# Patient Record
Sex: MALE | Race: WHITE | URBAN - METROPOLITAN AREA
[De-identification: names, ages, dates, MRNs, and addresses within clinical notes are randomized per-mention and may not be internally consistent; named-entity substitution may affect disease eponyms.]

---

## 2019-01-25 ENCOUNTER — OFFICE VISIT (OUTPATIENT)
Dept: URBAN - METROPOLITAN AREA CLINIC 29 | Facility: CLINIC | Age: 59
End: 2019-01-25
Payer: COMMERCIAL

## 2019-01-25 DIAGNOSIS — H52.223 REGULAR ASTIGMATISM, BILATERAL: Primary | ICD-10-CM

## 2019-01-25 PROCEDURE — 92002 INTRM OPH EXAM NEW PATIENT: CPT | Performed by: OPTOMETRIST

## 2019-01-25 ASSESSMENT — VISUAL ACUITY
OD: 20/20
OS: 20/20

## 2019-01-25 ASSESSMENT — INTRAOCULAR PRESSURE
OD: 15
OS: 15

## 2021-02-24 ENCOUNTER — APPOINTMENT (OUTPATIENT)
Dept: LAB | Facility: URGENT CARE | Age: 61
End: 2021-02-24

## 2021-02-24 ENCOUNTER — OFFICE VISIT (OUTPATIENT)
Dept: URGENT CARE | Facility: URGENT CARE | Age: 61
End: 2021-02-24

## 2021-02-24 VITALS
TEMPERATURE: 98.3 F | HEIGHT: 73 IN | WEIGHT: 301 LBS | BODY MASS INDEX: 39.89 KG/M2 | RESPIRATION RATE: 16 BRPM | OXYGEN SATURATION: 97 % | SYSTOLIC BLOOD PRESSURE: 139 MMHG | HEART RATE: 70 BPM | DIASTOLIC BLOOD PRESSURE: 84 MMHG

## 2021-02-24 DIAGNOSIS — Z02.89 ENCOUNTER FOR EXAMINATION REQUIRED BY DEPARTMENT OF TRANSPORTATION (DOT): Primary | ICD-10-CM

## 2021-02-24 LAB
BILIRUB UR QL: NEGATIVE
CLARITY UR: CLEAR
COLOR UR: YELLOW
GLUCOSE UR QL: NEGATIVE MG/DL
HGB UR QL: NEGATIVE
KETONES UR-MCNC: NEGATIVE MG/DL
LEUKOCYTE ESTERASE UR QL STRIP: NEGATIVE
NITRITE UR QL: NEGATIVE
PH UR: 7 PH
PROT UR STRIP-MCNC: NEGATIVE MG/DL
SP GR UR: 1.02 (ref 1–1.03)
UROBILINOGEN UR-MCNC: 1 E.U./DL

## 2021-02-24 PROCEDURE — 99000 SPECIMEN HANDLING OFFICE-LAB: CPT | Performed by: PREVENTIVE MEDICINE

## 2021-02-24 PROCEDURE — 99455 WORK RELATED DISABILITY EXAM: CPT | Mod: DOT | Performed by: PHYSICIAN ASSISTANT

## 2021-02-24 PROCEDURE — 81003 URINALYSIS AUTO W/O SCOPE: CPT | Mod: NC | Performed by: PHYSICIAN ASSISTANT

## 2021-02-24 RX ORDER — SIMVASTATIN 20 MG/1
20 TABLET, FILM COATED ORAL NIGHTLY
COMMUNITY

## 2021-02-24 ASSESSMENT — PAIN SCALES - GENERAL: PAINLEVEL: 0-NO PAIN

## 2021-02-24 NOTE — PROGRESS NOTES
"Urgent Care Visit Note  Subjective     Chief Complaint  DOT medical clearance    History of Present Illness  Ezequiel Meng is a 60 y.o. male presenting for DOT medical clearance.  Please see scanned document.    No Known Allergies  Current Outpatient Medications   Medication Sig Dispense Refill   • simvastatin (Zocor) 20 mg tablet Take 20 mg by mouth nightly       No current facility-administered medications for this visit.       Objective   Vital Signs  /84 (BP Location: Right arm, Patient Position: Sitting, Cuff Size: Long Adult)   Pulse 70   Temp 36.8 °C (98.3 °F) (Temporal)   Resp 16   Ht 1.854 m (6' 1\")   Wt (!) 136.5 kg (301 lb)   SpO2 97%   BMI 39.71 kg/m²     Physical Exam  Please see scanned document.    Lab Review  Recent Results (from the past 2 hour(s))   Urinalysis, dipstick Urine, Clean Catch    Collection Time: 02/24/21  8:47 AM   Result Value Ref Range    Color, Urine Yellow Yellow    Clarity, Urine Clear Clear    Specific Gravity, Urine 1.020 1.003 - 1.030    Leukocytes, Urine Negative Negative    Nitrite, Urine Negative Negative    Protein, Urine Negative Negative mg/dL    Ketones, Urine Negative Negative mg/dL    Urobilinogen, Urine 1.0 <2.0 E.U./dL    Bilirubin, Urine Negative Negative    Blood, Urine Negative Negative    Glucose, Urine Negative Negative mg/dL    pH, Urine 7.0 5.0 - 8.0 PH       Assessment/Plan     Please see scanned document.  DOT medical clearance was given for 2 year(s).    Patient Education: Ready to learn, no apparent learning barriers were identified; learning preference includes listening.  Explained diagnosis and treatment plan; patient/caregiver expressed understanding of the content.    Arlette Shane PA-C  2/24/2021  "

## 2021-12-13 ENCOUNTER — OFFICE VISIT (OUTPATIENT)
Dept: URBAN - METROPOLITAN AREA CLINIC 22 | Facility: CLINIC | Age: 61
End: 2021-12-13
Payer: COMMERCIAL

## 2021-12-13 DIAGNOSIS — H04.123 DRY EYE SYNDROME OF BILATERAL LACRIMAL GLANDS: ICD-10-CM

## 2021-12-13 PROCEDURE — 92014 COMPRE OPH EXAM EST PT 1/>: CPT | Performed by: STUDENT IN AN ORGANIZED HEALTH CARE EDUCATION/TRAINING PROGRAM

## 2021-12-13 ASSESSMENT — VISUAL ACUITY
OD: 20/20
OS: 20/20

## 2021-12-13 ASSESSMENT — INTRAOCULAR PRESSURE
OD: 16
OS: 15